# Patient Record
Sex: MALE | Race: WHITE | NOT HISPANIC OR LATINO | Employment: OTHER | URBAN - METROPOLITAN AREA
[De-identification: names, ages, dates, MRNs, and addresses within clinical notes are randomized per-mention and may not be internally consistent; named-entity substitution may affect disease eponyms.]

---

## 2021-01-14 DIAGNOSIS — Z23 NEED FOR VACCINATION: ICD-10-CM

## 2021-01-17 ENCOUNTER — HOSPITAL ENCOUNTER (OUTPATIENT)
Facility: MEDICAL CENTER | Age: 76
End: 2021-01-20
Attending: EMERGENCY MEDICINE | Admitting: STUDENT IN AN ORGANIZED HEALTH CARE EDUCATION/TRAINING PROGRAM
Payer: COMMERCIAL

## 2021-01-17 ENCOUNTER — APPOINTMENT (OUTPATIENT)
Dept: RADIOLOGY | Facility: MEDICAL CENTER | Age: 76
End: 2021-01-17
Attending: EMERGENCY MEDICINE
Payer: COMMERCIAL

## 2021-01-17 DIAGNOSIS — S01.311A LACERATION OF RIGHT EAR LOBE, INITIAL ENCOUNTER: ICD-10-CM

## 2021-01-17 DIAGNOSIS — R40.4 ALTERED LEVEL OF CONSCIOUSNESS: ICD-10-CM
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
## 2021-01-17 DIAGNOSIS — S09.90XA CLOSED HEAD INJURY, INITIAL ENCOUNTER: ICD-10-CM

## 2021-01-17 LAB
BASOPHILS # BLD AUTO: 0.7 % (ref 0–1.8)
BASOPHILS # BLD: 0.08 K/UL (ref 0–0.12)
EOSINOPHIL # BLD AUTO: 0.23 K/UL (ref 0–0.51)
EOSINOPHIL NFR BLD: 1.9 % (ref 0–6.9)
ERYTHROCYTE [DISTWIDTH] IN BLOOD BY AUTOMATED COUNT: 44.7 FL (ref 35.9–50)
HCT VFR BLD AUTO: 45.1 % (ref 42–52)
HGB BLD-MCNC: 14.8 G/DL (ref 14–18)
IMM GRANULOCYTES # BLD AUTO: 0.1 K/UL (ref 0–0.11)
IMM GRANULOCYTES NFR BLD AUTO: 0.8 % (ref 0–0.9)
LYMPHOCYTES # BLD AUTO: 4.33 K/UL (ref 1–4.8)
LYMPHOCYTES NFR BLD: 35.3 % (ref 22–41)
MCH RBC QN AUTO: 30.2 PG (ref 27–33)
MCHC RBC AUTO-ENTMCNC: 32.8 G/DL (ref 33.7–35.3)
MCV RBC AUTO: 92 FL (ref 81.4–97.8)
MONOCYTES # BLD AUTO: 0.91 K/UL (ref 0–0.85)
MONOCYTES NFR BLD AUTO: 7.4 % (ref 0–13.4)
NEUTROPHILS # BLD AUTO: 6.63 K/UL (ref 1.82–7.42)
NEUTROPHILS NFR BLD: 53.9 % (ref 44–72)
NRBC # BLD AUTO: 0 K/UL
NRBC BLD-RTO: 0 /100 WBC
PLATELET # BLD AUTO: 219 K/UL (ref 164–446)
PMV BLD AUTO: 9.3 FL (ref 9–12.9)
RBC # BLD AUTO: 4.9 M/UL (ref 4.7–6.1)
WBC # BLD AUTO: 12.3 K/UL (ref 4.8–10.8)

## 2021-01-17 PROCEDURE — 304217 HCHG IRRIGATION SYSTEM

## 2021-01-17 PROCEDURE — 70450 CT HEAD/BRAIN W/O DYE: CPT

## 2021-01-17 PROCEDURE — 96372 THER/PROPH/DIAG INJ SC/IM: CPT

## 2021-01-17 PROCEDURE — 85025 COMPLETE CBC W/AUTO DIFF WBC: CPT

## 2021-01-17 PROCEDURE — 304999 HCHG REPAIR-SIMPLE/INTERMED LEVEL 1

## 2021-01-17 PROCEDURE — 36415 COLL VENOUS BLD VENIPUNCTURE: CPT

## 2021-01-17 PROCEDURE — 99285 EMERGENCY DEPT VISIT HI MDM: CPT

## 2021-01-17 PROCEDURE — 82077 ASSAY SPEC XCP UR&BREATH IA: CPT

## 2021-01-17 PROCEDURE — 93005 ELECTROCARDIOGRAM TRACING: CPT | Performed by: EMERGENCY MEDICINE

## 2021-01-17 PROCEDURE — 303747 HCHG EXTRA SUTURE

## 2021-01-17 PROCEDURE — 700111 HCHG RX REV CODE 636 W/ 250 OVERRIDE (IP)

## 2021-01-17 PROCEDURE — 83735 ASSAY OF MAGNESIUM: CPT

## 2021-01-17 PROCEDURE — 84146 ASSAY OF PROLACTIN: CPT

## 2021-01-17 PROCEDURE — 84484 ASSAY OF TROPONIN QUANT: CPT

## 2021-01-17 PROCEDURE — 72125 CT NECK SPINE W/O DYE: CPT

## 2021-01-17 PROCEDURE — 80053 COMPREHEN METABOLIC PANEL: CPT

## 2021-01-17 RX ORDER — HALOPERIDOL 5 MG/ML
INJECTION INTRAMUSCULAR
Status: COMPLETED
Start: 2021-01-17 | End: 2021-01-17

## 2021-01-17 RX ORDER — HALOPERIDOL 5 MG/ML
5 INJECTION INTRAMUSCULAR EVERY 4 HOURS PRN
Status: DISCONTINUED | OUTPATIENT
Start: 2021-01-17 | End: 2021-01-20 | Stop reason: HOSPADM

## 2021-01-17 RX ADMIN — HALOPERIDOL LACTATE 5 MG: 5 INJECTION, SOLUTION INTRAMUSCULAR at 23:09

## 2021-01-18 ENCOUNTER — APPOINTMENT (OUTPATIENT)
Dept: RADIOLOGY | Facility: MEDICAL CENTER | Age: 76
End: 2021-01-18
Attending: EMERGENCY MEDICINE
Payer: COMMERCIAL

## 2021-01-18 ENCOUNTER — APPOINTMENT (OUTPATIENT)
Dept: RADIOLOGY | Facility: MEDICAL CENTER | Age: 76
End: 2021-01-18
Attending: STUDENT IN AN ORGANIZED HEALTH CARE EDUCATION/TRAINING PROGRAM
Payer: COMMERCIAL

## 2021-01-18 DIAGNOSIS — R56.9 SEIZURE-LIKE ACTIVITY (HCC): Primary | ICD-10-CM

## 2021-01-18 PROBLEM — Z72.0 TOBACCO USE: Status: ACTIVE | Noted: 2021-01-18

## 2021-01-18 PROBLEM — E87.29 METABOLIC ACIDOSIS, INCREASED ANION GAP (IAG): Status: ACTIVE | Noted: 2021-01-18

## 2021-01-18 PROBLEM — D72.829 LEUKOCYTOSIS: Status: ACTIVE | Noted: 2021-01-18

## 2021-01-18 LAB
ALBUMIN SERPL BCP-MCNC: 4 G/DL (ref 3.2–4.9)
ALBUMIN/GLOB SERPL: 1.3 G/DL
ALP SERPL-CCNC: 79 U/L (ref 30–99)
ALT SERPL-CCNC: 8 U/L (ref 2–50)
AMMONIA PLAS-SCNC: 24 UMOL/L (ref 11–45)
ANION GAP SERPL CALC-SCNC: 18 MMOL/L (ref 7–16)
APPEARANCE UR: CLEAR
AST SERPL-CCNC: 13 U/L (ref 12–45)
BACTERIA #/AREA URNS HPF: ABNORMAL /HPF
BILIRUB SERPL-MCNC: 0.2 MG/DL (ref 0.1–1.5)
BILIRUB UR QL STRIP.AUTO: NEGATIVE
BUN SERPL-MCNC: 16 MG/DL (ref 8–22)
CALCIUM SERPL-MCNC: 9.2 MG/DL (ref 8.5–10.5)
CHLORIDE SERPL-SCNC: 101 MMOL/L (ref 96–112)
CK SERPL-CCNC: 290 U/L (ref 0–154)
CO2 SERPL-SCNC: 16 MMOL/L (ref 20–33)
COLOR UR: YELLOW
CREAT SERPL-MCNC: 1.12 MG/DL (ref 0.5–1.4)
EKG IMPRESSION: NORMAL
EPI CELLS #/AREA URNS HPF: NEGATIVE /HPF
ETHANOL BLD-MCNC: <10.1 MG/DL (ref 0–10)
GLOBULIN SER CALC-MCNC: 3.1 G/DL (ref 1.9–3.5)
GLUCOSE SERPL-MCNC: 165 MG/DL (ref 65–99)
GLUCOSE UR STRIP.AUTO-MCNC: NEGATIVE MG/DL
HYALINE CASTS #/AREA URNS LPF: ABNORMAL /LPF
KETONES UR STRIP.AUTO-MCNC: NEGATIVE MG/DL
LACTATE BLD-SCNC: 1.3 MMOL/L (ref 0.5–2)
LACTATE BLD-SCNC: 2.8 MMOL/L (ref 0.5–2)
LEUKOCYTE ESTERASE UR QL STRIP.AUTO: NEGATIVE
MAGNESIUM SERPL-MCNC: 2.1 MG/DL (ref 1.5–2.5)
MICRO URNS: ABNORMAL
NITRITE UR QL STRIP.AUTO: POSITIVE
PH UR STRIP.AUTO: 6.5 [PH] (ref 5–8)
POTASSIUM SERPL-SCNC: 3.5 MMOL/L (ref 3.6–5.5)
PROLACTIN SERPL-MCNC: 56.6 NG/ML (ref 2.1–17.7)
PROT SERPL-MCNC: 7.1 G/DL (ref 6–8.2)
PROT UR QL STRIP: NEGATIVE MG/DL
RBC # URNS HPF: ABNORMAL /HPF
RBC UR QL AUTO: NEGATIVE
SARS-COV+SARS-COV-2 AG RESP QL IA.RAPID: NOTDETECTED
SARS-COV-2 RNA RESP QL NAA+PROBE: NOTDETECTED
SODIUM SERPL-SCNC: 135 MMOL/L (ref 135–145)
SP GR UR STRIP.AUTO: 1.03
SPECIMEN SOURCE: NORMAL
SPECIMEN SOURCE: NORMAL
TROPONIN T SERPL-MCNC: 10 NG/L (ref 6–19)
UROBILINOGEN UR STRIP.AUTO-MCNC: 0.2 MG/DL
WBC #/AREA URNS HPF: ABNORMAL /HPF

## 2021-01-18 PROCEDURE — 303747 HCHG EXTRA SUTURE

## 2021-01-18 PROCEDURE — 304999 HCHG REPAIR-SIMPLE/INTERMED LEVEL 1

## 2021-01-18 PROCEDURE — 700117 HCHG RX CONTRAST REV CODE 255: Performed by: EMERGENCY MEDICINE

## 2021-01-18 PROCEDURE — 82140 ASSAY OF AMMONIA: CPT

## 2021-01-18 PROCEDURE — 700111 HCHG RX REV CODE 636 W/ 250 OVERRIDE (IP): Performed by: STUDENT IN AN ORGANIZED HEALTH CARE EDUCATION/TRAINING PROGRAM

## 2021-01-18 PROCEDURE — U0005 INFEC AGEN DETEC AMPLI PROBE: HCPCS

## 2021-01-18 PROCEDURE — G0378 HOSPITAL OBSERVATION PER HR: HCPCS

## 2021-01-18 PROCEDURE — 82550 ASSAY OF CK (CPK): CPT

## 2021-01-18 PROCEDURE — 74018 RADEX ABDOMEN 1 VIEW: CPT

## 2021-01-18 PROCEDURE — 70498 CT ANGIOGRAPHY NECK: CPT

## 2021-01-18 PROCEDURE — 95816 EEG AWAKE AND DROWSY: CPT | Performed by: STUDENT IN AN ORGANIZED HEALTH CARE EDUCATION/TRAINING PROGRAM

## 2021-01-18 PROCEDURE — 0042T CT-CEREBRAL PERFUSION ANALYSIS: CPT

## 2021-01-18 PROCEDURE — 304217 HCHG IRRIGATION SYSTEM

## 2021-01-18 PROCEDURE — 36415 COLL VENOUS BLD VENIPUNCTURE: CPT

## 2021-01-18 PROCEDURE — 71045 X-RAY EXAM CHEST 1 VIEW: CPT

## 2021-01-18 PROCEDURE — 700101 HCHG RX REV CODE 250: Performed by: EMERGENCY MEDICINE

## 2021-01-18 PROCEDURE — 87426 SARSCOV CORONAVIRUS AG IA: CPT

## 2021-01-18 PROCEDURE — 99406 BEHAV CHNG SMOKING 3-10 MIN: CPT | Performed by: STUDENT IN AN ORGANIZED HEALTH CARE EDUCATION/TRAINING PROGRAM

## 2021-01-18 PROCEDURE — A9270 NON-COVERED ITEM OR SERVICE: HCPCS | Performed by: STUDENT IN AN ORGANIZED HEALTH CARE EDUCATION/TRAINING PROGRAM

## 2021-01-18 PROCEDURE — 95816 EEG AWAKE AND DROWSY: CPT | Mod: 26 | Performed by: STUDENT IN AN ORGANIZED HEALTH CARE EDUCATION/TRAINING PROGRAM

## 2021-01-18 PROCEDURE — U0003 INFECTIOUS AGENT DETECTION BY NUCLEIC ACID (DNA OR RNA); SEVERE ACUTE RESPIRATORY SYNDROME CORONAVIRUS 2 (SARS-COV-2) (CORONAVIRUS DISEASE [COVID-19]), AMPLIFIED PROBE TECHNIQUE, MAKING USE OF HIGH THROUGHPUT TECHNOLOGIES AS DESCRIBED BY CMS-2020-01-R: HCPCS

## 2021-01-18 PROCEDURE — 83605 ASSAY OF LACTIC ACID: CPT

## 2021-01-18 PROCEDURE — 70496 CT ANGIOGRAPHY HEAD: CPT

## 2021-01-18 PROCEDURE — 81001 URINALYSIS AUTO W/SCOPE: CPT

## 2021-01-18 PROCEDURE — 700105 HCHG RX REV CODE 258: Performed by: STUDENT IN AN ORGANIZED HEALTH CARE EDUCATION/TRAINING PROGRAM

## 2021-01-18 PROCEDURE — 700102 HCHG RX REV CODE 250 W/ 637 OVERRIDE(OP): Performed by: STUDENT IN AN ORGANIZED HEALTH CARE EDUCATION/TRAINING PROGRAM

## 2021-01-18 PROCEDURE — 99220 PR INITIAL OBSERVATION CARE,LEVL III: CPT | Mod: 25 | Performed by: STUDENT IN AN ORGANIZED HEALTH CARE EDUCATION/TRAINING PROGRAM

## 2021-01-18 RX ORDER — BISACODYL 10 MG
10 SUPPOSITORY, RECTAL RECTAL
Status: DISCONTINUED | OUTPATIENT
Start: 2021-01-18 | End: 2021-01-20 | Stop reason: HOSPADM

## 2021-01-18 RX ORDER — AMOXICILLIN 250 MG
2 CAPSULE ORAL 2 TIMES DAILY
Status: DISCONTINUED | OUTPATIENT
Start: 2021-01-18 | End: 2021-01-20 | Stop reason: HOSPADM

## 2021-01-18 RX ORDER — RISPERIDONE 1 MG/1
1 TABLET ORAL 2 TIMES DAILY
Status: DISCONTINUED | OUTPATIENT
Start: 2021-01-18 | End: 2021-01-18

## 2021-01-18 RX ORDER — AMITRIPTYLINE HYDROCHLORIDE 10 MG/1
10 TABLET, FILM COATED ORAL NIGHTLY
Status: DISCONTINUED | OUTPATIENT
Start: 2021-01-18 | End: 2021-01-18

## 2021-01-18 RX ORDER — LIDOCAINE HYDROCHLORIDE 10 MG/ML
20 INJECTION, SOLUTION INFILTRATION; PERINEURAL ONCE
Status: COMPLETED | OUTPATIENT
Start: 2021-01-18 | End: 2021-01-18

## 2021-01-18 RX ORDER — PROPRANOLOL HYDROCHLORIDE 10 MG/1
10 TABLET ORAL 2 TIMES DAILY
Status: DISCONTINUED | OUTPATIENT
Start: 2021-01-18 | End: 2021-01-18

## 2021-01-18 RX ORDER — NICOTINE 21 MG/24HR
21 PATCH, TRANSDERMAL 24 HOURS TRANSDERMAL
Status: DISCONTINUED | OUTPATIENT
Start: 2021-01-18 | End: 2021-01-20 | Stop reason: HOSPADM

## 2021-01-18 RX ORDER — ACETAMINOPHEN 325 MG/1
650 TABLET ORAL EVERY 6 HOURS PRN
Status: DISCONTINUED | OUTPATIENT
Start: 2021-01-18 | End: 2021-01-20 | Stop reason: HOSPADM

## 2021-01-18 RX ORDER — LABETALOL HYDROCHLORIDE 5 MG/ML
10 INJECTION, SOLUTION INTRAVENOUS EVERY 4 HOURS PRN
Status: DISCONTINUED | OUTPATIENT
Start: 2021-01-18 | End: 2021-01-20 | Stop reason: HOSPADM

## 2021-01-18 RX ORDER — OMEPRAZOLE 20 MG/1
20 CAPSULE, DELAYED RELEASE ORAL DAILY
Status: DISCONTINUED | OUTPATIENT
Start: 2021-01-18 | End: 2021-01-20 | Stop reason: HOSPADM

## 2021-01-18 RX ORDER — POLYETHYLENE GLYCOL 3350 17 G/17G
1 POWDER, FOR SOLUTION ORAL
Status: DISCONTINUED | OUTPATIENT
Start: 2021-01-18 | End: 2021-01-20 | Stop reason: HOSPADM

## 2021-01-18 RX ORDER — LORAZEPAM 2 MG/ML
4 INJECTION INTRAMUSCULAR
Status: DISCONTINUED | OUTPATIENT
Start: 2021-01-18 | End: 2021-01-20 | Stop reason: HOSPADM

## 2021-01-18 RX ORDER — SODIUM CHLORIDE, SODIUM LACTATE, POTASSIUM CHLORIDE, CALCIUM CHLORIDE 600; 310; 30; 20 MG/100ML; MG/100ML; MG/100ML; MG/100ML
1000 INJECTION, SOLUTION INTRAVENOUS CONTINUOUS
Status: ACTIVE | OUTPATIENT
Start: 2021-01-18 | End: 2021-01-19

## 2021-01-18 RX ORDER — POTASSIUM CHLORIDE 20 MEQ/1
40 TABLET, EXTENDED RELEASE ORAL ONCE
Status: COMPLETED | OUTPATIENT
Start: 2021-01-18 | End: 2021-01-18

## 2021-01-18 RX ADMIN — IOHEXOL 80 ML: 350 INJECTION, SOLUTION INTRAVENOUS at 00:47

## 2021-01-18 RX ADMIN — OMEPRAZOLE 20 MG: 20 CAPSULE, DELAYED RELEASE ORAL at 06:27

## 2021-01-18 RX ADMIN — TETRACAINE HCL 3 ML: 10 INJECTION SUBARACHNOID at 00:46

## 2021-01-18 RX ADMIN — ENOXAPARIN SODIUM 40 MG: 40 INJECTION SUBCUTANEOUS at 06:27

## 2021-01-18 RX ADMIN — SODIUM CHLORIDE, POTASSIUM CHLORIDE, SODIUM LACTATE AND CALCIUM CHLORIDE 1000 ML: 600; 310; 30; 20 INJECTION, SOLUTION INTRAVENOUS at 14:55

## 2021-01-18 RX ADMIN — POTASSIUM CHLORIDE 40 MEQ: 1500 TABLET, EXTENDED RELEASE ORAL at 06:27

## 2021-01-18 RX ADMIN — LIDOCAINE HYDROCHLORIDE 20 ML: 10 INJECTION, SOLUTION INFILTRATION; PERINEURAL at 01:30

## 2021-01-18 RX ADMIN — NICOTINE TRANSDERMAL SYSTEM 21 MG: 21 PATCH, EXTENDED RELEASE TRANSDERMAL at 06:27

## 2021-01-18 RX ADMIN — SODIUM CHLORIDE, POTASSIUM CHLORIDE, SODIUM LACTATE AND CALCIUM CHLORIDE 1000 ML: 600; 310; 30; 20 INJECTION, SOLUTION INTRAVENOUS at 21:35

## 2021-01-18 ASSESSMENT — ENCOUNTER SYMPTOMS
DEPRESSION: 0
WEAKNESS: 0
BACK PAIN: 0
SHORTNESS OF BREATH: 0
ABDOMINAL PAIN: 0
SORE THROAT: 0
FOCAL WEAKNESS: 0
DOUBLE VISION: 0
BRUISES/BLEEDS EASILY: 0
DIARRHEA: 0
PALPITATIONS: 0
LOSS OF CONSCIOUSNESS: 1
NAUSEA: 0
HEARTBURN: 0
CHILLS: 0
VOMITING: 0
BLURRED VISION: 0
CONSTIPATION: 0
COUGH: 0
INSOMNIA: 0
NECK PAIN: 0
BLOOD IN STOOL: 0
WHEEZING: 0
HEADACHES: 1
FEVER: 0

## 2021-01-18 ASSESSMENT — PAIN DESCRIPTION - PAIN TYPE
TYPE: ACUTE PAIN

## 2021-01-18 ASSESSMENT — LIFESTYLE VARIABLES: REASON UNABLE TO ASSESS: AMS

## 2021-01-18 NOTE — PROGRESS NOTES
20# placed to Left Forearm.   Lactic Acid drawn and sent to lab.   Pt reseting comfortable, Call light within reach of pt.   Tele-monitoring, pulse ox in place.

## 2021-01-18 NOTE — PROGRESS NOTES
Pt unable to answer MRI screening questions appropriately. Per MD okay to get xray's done prior to MRI. Orders received. MRI technician notified.

## 2021-01-18 NOTE — DISCHARGE PLANNING
Medical Social Work    MSW responded to TBI for pt Sudeep Boyd  1945. Per [facility]Y pts daughter was on scene and should be coming to hospital.

## 2021-01-18 NOTE — DISCHARGE PLANNING
Bedside assessment completed with information obtained from pt's Daughter Niya. Niya states she recently moved to [place] to help father with his needs. They live together at [address] [place] [address]. Niya is unaware of PCP for her father. Niya states she is POA for healthcare and will bring in paperwork on returning to hospital in am. Niya states that pt was independent prior to this fall. [facility] comes weekly. Pt has a walker, shower chair and cane at home. Niya says they will use the [facility] for any RX's prescribed and be available upon pt's DC for transportation.     Care Transition Team Assessment    Information Source  Orientation : Unable to Assess  Information Given By: Relative  Informant's Name: Niya  Who is responsible for making decisions for patient? : POA  Name(s) of Primary Decision Maker: Niya    Readmission Evaluation  Is this a readmission?: No         Interdisciplinary Discharge Planning  Does Admitting Nurse Feel This Could be a Complex Discharge?: Yes  Primary Care Physician: (???)  Lives with - Patient's Self Care Capacity: Adult Children(Niya (daughter))  Support Systems: Friends / Neighbors(Daughter (Niya))  Housing / Facility: [address] [place] [address]  Do You Take your Prescribed Medications Regularly: ([facility])  Able to Return to Previous ADL's: (unknown)  Mobility Issues: (prior to admit no issues)  Prior Services: Home With Outpatient Therapy  Patient Prefers to be Discharged to:: unknown  Assistance Needed: Unknown at this Time  Durable Medical Equipment: Walker(shower chair, cane)  DME Provider / Phone: [facility]([phone])    Discharge Preparedness  What are your discharge supports?: (Daughter Niya)  Prior Functional Level: Ambulatory, Independent with Activities of Daily Living, Uses Cane, Uses Walker  Difficulity with ADLs: Walking  Difficulty with ADLs Comment: cane or walker  Difficulity with IADLs: Driving, Cooking, Laundry, Shopping  Difficulity  "with IADL Comments: (Niya (daughter) assists)    Functional Assesment  Prior Functional Level: Ambulatory, Independent with Activities of Daily Living, Uses Cane, Uses Walker    Finances  Financial Barriers to Discharge: (unknown)  Prescription Coverage: (unknown)    Vision / Hearing Impairment  Vision Impairment : No  Hearing Impairment : No    Values / Beliefs / Concerns  Values / Beliefs Concerns : No    Advance Directive  Advance Directive?: Living Will         Psychological Assessment  History of Substance Abuse: Alcohol  Date Last Used - Alcohol: \"in the past\"   History of Psychiatric Problems: No    Discharge Risks or Barriers  Discharge risks or barriers?: Uninsured / underinsured, Complex medical needs  Patient risk factors: Cognitive / sensory / physical deficit, Lack of outside supports, Substance abuse    Anticipated Discharge Information  Discharge Disposition: Still a Patient (30)  Discharge Address: [address] [place] [address]  Discharge Contact Phone Number: Niya [phone]        "

## 2021-01-18 NOTE — H&P
Hospital Medicine History & Physical Note    Date of Service  1/18/2021    Primary Care Physician  Pcp Pt States None    Consultants  Neurology    Code Status  Full Code    Chief Complaint  Chief Complaint   Patient presents with   • T-5000 FALL   • ALOC       History of Presenting Illness  75 y.o. male who presented 1/17/2021 with daughter at bedside for ground-level fall presumed to be CVA versus seizure.  Daughter states that patient's last known normal time was approximately 1800 last night.  She states that prior to that moment she was downstairs in the basement and heard a thud and rushed upstairs.  She found her father on the floor with blood covering his face and tremor/seizure-like activity.  She denies that he had any tongue biting, fecal or urinary incontinence however did woke up confused and likely in a post ictal state.  Patient was noted to be confused upon evaluation per ERP.  ERP states no other focal neurologic deficits appreciated per their evaluation.    At time of evaluation, patient states that he has some fatigue and memory loss from the above event.  He also complains of right ear pain which had been sutured prior to my evaluation.  Patient is alert and oriented x3 on my evaluation without any focal neurologic deficits.  He does admit to smoking 2 packs/day and in agreement for nicotine patch for his admission.    Vitals on admission were as follows: 97.6, 96, 15, 147/57, 91% on room air.    Labs were performed and CBC reveals leukocytosis of 12.3 without immature granulocytosis or neutrophilia and otherwise unremarkable.  CMP was performed which revealed mild hypokalemia at 3.5, anion gap metabolic acidosis with anion gap of 18 and CO2 of 16 as well as mild hyperglycemia at 165.  Diagnostic alcohol level less than 10.1, troponin T of 10.  Magnesium level was ordered and resulted at 2.1 and prolactin elevated at 56.60.  Covid test was negative.    CT head without contrast revealed mild  cerebral atrophy without any intracranial acute findings.  CT spine without contrast was ordered and did not reveal any bony injury to cervical spine.  CT cerebral perfusion analysis within normal limits.  CTA head with and without did not reveal any large vessel occlusion or aneurysms.  CT angio neck with and without reveals 50% stenosis of right internal carotid artery at the bifurcation, less than 50% of left carotid bifurcation and 75% stenosis of the origin of the right vertebral artery.    Neurology was consulted and recommended EEG and MRI.    Patient and daughter in agreement with admission to hospital for seizure-like activity versus CVA.  He agrees to full code at time of evaluation with daughter present.  He will be optimized for medical management prior to transfer to walsh floor.      Review of Systems  Review of Systems   Constitutional: Positive for malaise/fatigue. Negative for chills and fever.   HENT: Positive for ear pain (right). Negative for congestion and sore throat.         Xerostomia   Eyes: Negative for blurred vision and double vision.   Respiratory: Negative for cough, shortness of breath and wheezing.    Cardiovascular: Negative for chest pain and palpitations.   Gastrointestinal: Negative for abdominal pain, blood in stool, constipation, diarrhea, heartburn, melena, nausea and vomiting.   Genitourinary: Negative for dysuria and frequency.   Musculoskeletal: Negative for back pain and neck pain.   Skin: Negative for itching and rash.   Neurological: Positive for loss of consciousness and headaches. Negative for focal weakness and weakness.   Endo/Heme/Allergies: Negative for environmental allergies. Does not bruise/bleed easily.   Psychiatric/Behavioral: Negative for depression. The patient does not have insomnia.        Past Medical History   has a past medical history of Alzheimer's dementia (HCC), Anxiety, Neuropathy, Psychiatric illness, and Tarsal tunnel syndrome.    Surgical  History   has no past surgical history on file.     Family History  family history includes Heart Disease in his father; Lung Disease in his mother.     Social History   reports that he has been smoking cigarettes. He has a 104.00 pack-year smoking history. He has never used smokeless tobacco. He reports current alcohol use. He reports current drug use.    Allergies  Allergies   Allergen Reactions   • Codeine        Medications  Prior to Admission Medications   Prescriptions Last Dose Informant Patient Reported? Taking?   amitriptyline (ELAVIL) 10 MG TABS  Patient's Home Pharmacy Yes No   Sig: Take 10 mg by mouth every evening.     nitrofurantoin monohydr macro (MACROBID) 100 MG CAPS   No No   Sig: Take 1 Cap by mouth 2 times a day.   omeprazole (PRILOSEC) 20 MG CPDR  Patient's Home Pharmacy Yes No   Sig: Take 20 mg by mouth every day.     propranolol (INDERAL) 10 MG TABS   Yes No   Sig: Take 10 mg by mouth 2 times a day.     risperidone (RISPERDAL) 1 MG TABS   No No   Sig: Take 1 Tab by mouth 2 times a day.      Facility-Administered Medications: None       Physical Exam  Temp:  [36.4 °C (97.6 °F)] 36.4 °C (97.6 °F)  Pulse:  [75-98] 75  Resp:  [14-23] 14  BP: (108-173)/(53-77) 108/53  SpO2:  [89 %-95 %] 95 %    Physical Exam  Vitals signs reviewed.   Constitutional:       Appearance: He is normal weight. He is not toxic-appearing or diaphoretic.   HENT:      Head: Normocephalic.      Comments: Right Ear lac & repaired     Mouth/Throat:      Mouth: Mucous membranes are moist.      Pharynx: Oropharynx is clear. No oropharyngeal exudate or posterior oropharyngeal erythema.   Eyes:      General: No scleral icterus.     Extraocular Movements: Extraocular movements intact.      Conjunctiva/sclera: Conjunctivae normal.      Pupils: Pupils are equal, round, and reactive to light.   Neck:      Musculoskeletal: Normal range of motion and neck supple. No muscular tenderness.      Vascular: No carotid bruit.   Cardiovascular:       Rate and Rhythm: Normal rate and regular rhythm.      Pulses: Normal pulses.      Heart sounds: Normal heart sounds. No murmur. No friction rub. No gallop.    Pulmonary:      Effort: Pulmonary effort is normal.      Breath sounds: Normal breath sounds. No wheezing, rhonchi or rales.   Abdominal:      General: Bowel sounds are normal. There is no distension.      Palpations: Abdomen is soft. There is no mass.      Tenderness: There is no abdominal tenderness. There is no guarding or rebound.      Hernia: No hernia is present.   Musculoskeletal: Normal range of motion.      Right lower leg: No edema.      Left lower leg: No edema.   Lymphadenopathy:      Cervical: No cervical adenopathy.   Skin:     General: Skin is warm and dry.      Capillary Refill: Capillary refill takes less than 2 seconds.      Coloration: Skin is not pale.      Findings: No erythema or rash.   Neurological:      General: No focal deficit present.      Mental Status: He is alert and oriented to person, place, and time. Mental status is at baseline.      Cranial Nerves: No cranial nerve deficit.      Sensory: No sensory deficit.      Motor: No weakness.      Coordination: Coordination normal.      Deep Tendon Reflexes: Reflexes normal.      Comments: Alert and oriented x3, moves all 4 extremities, no tremor like activity, muscle strength 5-5 upper and lower extremities bilaterally, deep tendon reflexes within normal limits upper and lower extremities bilaterally, cranial nerves II through XII intact.  Negative rapid alternating motion, negative finger-to-nose, negative for dysmetria, negative for loss of sensation upper and lower extremity bilaterally.  No focal deficits appreciated on physical examination.   Psychiatric:         Mood and Affect: Mood normal.         Behavior: Behavior normal.         Thought Content: Thought content normal.         Judgment: Judgment normal.         Laboratory:  Recent Labs     01/17/21  2319   WBC 12.3*    RBC 4.90   HEMOGLOBIN 14.8   HEMATOCRIT 45.1   MCV 92.0   MCH 30.2   MCHC 32.8*   RDW 44.7   PLATELETCT 219   MPV 9.3     Recent Labs     01/17/21  2319   SODIUM 135   POTASSIUM 3.5*   CHLORIDE 101   CO2 16*   GLUCOSE 165*   BUN 16   CREATININE 1.12   CALCIUM 9.2     Recent Labs     01/17/21  2319   ALTSGPT 8   ASTSGOT 13   ALKPHOSPHAT 79   TBILIRUBIN 0.2   GLUCOSE 165*         No results for input(s): NTPROBNP in the last 72 hours.      Recent Labs     01/17/21  2319   TROPONINT 10       Imaging:  CT-CTA HEAD WITH & W/O-POST PROCESS   Final Result         1.  No large vessel occlusion or aneurysm appreciated.      CT-CTA NECK WITH & W/O-POST PROCESSING   Final Result         1.  50% stenosis of the proximal right internal carotid artery at the bifurcation.   2.  Less than 50% stenosis of the left carotid bifurcation.   3.  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  Atherosclerosis of the origin of the left vertebral artery, appears resulting in component of stenosis however transverse orientation of the left vertebral artery at its origin limits characterization.         CT-CEREBRAL PERFUSION ANALYSIS   Final Result         1.  Cerebral blood flow less than 30% likely representing completed infarct = 0 mL.      2.  T Max more than 6 seconds likely representing combination of completed infarct and ischemia = 0 mL.      3.  Mismatched volume likely representing ischemic brain/penumbra = None      4.  Please note that the cerebral perfusion was performed on the limited brain tissue around the basal ganglia region. Infarct/ischemia outside the CT perfusion sections can be missed in this study.      CT-HEAD W/O   Final Result         1.  No acute intracranial abnormality is identified, there are nonspecific white matter changes, commonly associated with small vessel ischemic disease.  Associated mild cerebral atrophy is noted.   2.  Atherosclerosis.      CT-CSPINE WITHOUT PLUS RECONS   Final Result          1.  No acute traumatic bony injury of the cervical spine is apparent.   2.  Atherosclerosis      MR-BRAIN-W/O    (Results Pending)   I reviewed the above radiographic imaging and do not appreciate any acute intracranial abnormalities.      Assessment/Plan:  I anticipate this patient is appropriate for observation status at this time.    * Seizure-like activity (HCC)- (present on admission)  Assessment & Plan  Lorazepam 4 mg IV every 10 minutes as needed seizure-like activity  All radiographic imaging regarding head negative at this point   Neurology consulted from ED and recommends EEG and MRI both ordered and pending  I had ordered prolactin which has been elevated on this admission likely indicating seizure-like activity  No focal neurologic deficits appreciated per my evaluation    Hypokalemia- (present on admission)  Assessment & Plan  Potassium level on admission 3.5  We will administer 40 mEq of K-Dur x1  BMP in a.m.  Magnesium within normal limits    Leukocytosis- (present on admission)  Assessment & Plan  Likely reactive as there is no neutrophilia or immature granulocytosis indicating infectious etiology.  CBC in a.m.    Tobacco use- (present on admission)  Assessment & Plan  I have counseled the patient on smoking cessation for 5 minutes and he is in agreement with nicotine patch.  He also is in agreement with outpatient smoking cessation counseling as he would like to quit smoking.    Metabolic acidosis, increased anion gap (IAG)- (present on admission)  Assessment & Plan  Likely related to lactic acidosis from seizure-like activity  We will check lactic acid levels  Alcohol level less than 10  BMP in a.m.

## 2021-01-18 NOTE — PROCEDURES
VIDEO ELECTROENCEPHALOGRAM REPORT      Referring provider: Dr. Mason    DOS: 01/18/21 (0 hours and 26 minutes of total recording time).     INDICATION:  Sudeep Boyd 75 y.o. male presenting with altered mental status and seizure-like activity    CURRENT ANTIEPILEPTIC AND/OR SEDATING REGIMEN: No AEDs    TECHNIQUE: 30 channel video electroencephalogram (EEG) was performed in accordance with the international 10-20 system. The study was reviewed in bipolar and referential montages. The recording examined the patient in the awake and drowsy state(s).     DESCRIPTION OF THE RECORD:  During wakefulness, the background was continuous and showed a 8.5-9 Hz posterior dominant rhythm.  There was reactivity to eye closure/opening.  A normal anterior-posterior gradient was noted with faster beta frequencies seen anteriorly.  During drowsiness, theta/delta frequencies were seen.    Sleep was captured and was characterized by diffuse background delta/theta activity with a loss of myogenic artifact.  N2 sleep transients in the form of sleep spindles and vertex waves were seen in the leads over the central regions.     ACTIVATION PROCEDURES:   Intermittent Photic stimulation was performed in a stepwise fashion from 1 to 30 Hz, and did not elicit any abnormal responses.      ICTAL AND INTERICTAL FINDINGS:   No focal or generalized epileptiform activity noted.     No regional slowing was seen during this routine study.      No clinical events or seizures were reported or recorded during the study.     EKG: sampling of the EKG recording did not demonstrate any abnormalities    EVENTS:  None    INTERPRETATION:  normal video EEG recording in the awake and drowsy state(s):  - No persistent focal asymmetries seen.  - No definitive epileptiform discharges seen   - No definitive seizures. Clinical correlation is recommended.      Note: A normal EEG does not rule out epilepsy.  If the clinical suspicion remains high for seizures, a  prolonged recording to capture clinical or subclinical events may be helpful.        Travis Mir MD  Epilepsy and General Neurology  Department of Neurology  Instructor of Clinical Neurology [facility].   Office: [phone]  Fax: [phone]

## 2021-01-18 NOTE — ED PROVIDER NOTES
ED Provider Note    Scribed for Tony Arevalo M.D. by Kaden Hernandez. 1/17/2021  11:06 PM    Primary care provider: Pcp Pt States None  Means of arrival: EMS  History obtained from: EMS and patient's daughter  History limited by: Altered level of consciousness    CHIEF COMPLAINT  Chief Complaint   Patient presents with   • T-5000 FALL   • ALOC     HPI  Sudeep Boyd is a 75 y.o. male who presents to the Emergency Department for a possible TBI. Per the patient's daughter she was downstairs in the basement of the home they share when she heard a thud upstairs; upon going upstairs, she found him on the floor bleeding from his right ear and drooling. The last time she saw him at baseline was at 6:30 PM; at that time he was talking normally to her. She denies cough, fever, diarrhea, drug use, history of seizures, change in medication, and adds that he last drank alcohol 10 years ago. EMS states that when they arrived on scene, the patient was altered and could not answer their questions, such as what his name is; patient's daughter told EMS that he is normally alert and oriented. On route to the ED, his blood pressure was 190/90, and he reportedly pulled away from painful stimuli.       HPI limited by: Altered level of consciousness    REVIEW OF SYSTEMS  Pertinent positives include: Ground level fall, ALOC, laceration to the right ear. Pertinent negatives include: Cough, fever, or diarrhea. See history of present illness.     ROS limited by: Altered level of consciousness      PAST MEDICAL HISTORY   has a past medical history of Alzheimer's dementia, Anxiety, Neuropathy, Psychiatric illness, and Tarsal tunnel syndrome.    SURGICAL HISTORY  patient denies any surgical history    SOCIAL HISTORY  Social History     Tobacco Use   • Smoking status: Current Every Day Smoker     Packs/day: 2.00     Years: 52.00     Pack years: 104.00     Types: Cigarettes   • Smokeless tobacco: Never Used   Substance Use Topics   •  "Alcohol use: Yes     Comment: 5 beers a day   • Drug use: Yes     Comment: history of Marajuana use      Social History     Substance and Sexual Activity   Drug Use Yes    Comment: history of Marajuana use       FAMILY HISTORY  None pertinent     CURRENT MEDICATIONS  Current Outpatient Medications   Medication Instructions   • amitriptyline (ELAVIL) 10 mg, NIGHTLY   • nitrofurantoin (MACROBID) 100 mg, Oral, 2 TIMES DAILY   • omeprazole (PRILOSEC) 20 mg, DAILY   • propranolol (INDERAL) 10 mg, 2 TIMES DAILY   • risperiDONE (RISPERDAL) 1 mg, Oral, 2 TIMES DAILY     ALLERGIES  Allergies   Allergen Reactions   • Codeine        PHYSICAL EXAM  VITAL SIGNS: /58   Pulse 88   Resp (!) 21   Ht 1.803 m (5' 11\")   Wt 72.1 kg (159 lb)   SpO2 92%   BMI 22.18 kg/m²     Constitutional: Alert in no apparent distress.  HENT:  Bilateral external ears normal, Nose normal. Uvula midline. Right ear laceration with no obvious hematoma. 3cm laceration.   Eyes: Pupils are equal and reactive, Conjunctiva normal, Non-icteric.   Neck: Normal range of motion, No tenderness, Supple, No stridor.   Lymphatic: No lymphadenopathy noted.   Cardiovascular: Regular rate and rhythm, no murmurs.   Thorax & Lungs: Normal breath sounds, No respiratory distress, No wheezing, No chest tenderness.   Abdomen:  Soft, No tenderness, No peritoneal signs, No masses, No pulsatile masses.   Skin: Warm, Dry, No erythema, No rash.   Back: No bony tenderness, No CVA tenderness.   Extremities: Intact distal pulses, No edema, No tenderness, No cyanosis.  Musculoskeletal: Good range of motion in all major joints. No tenderness to palpation or major deformities noted.   Neurologic: Alert , Normal motor function, Normal sensory function, No focal deficits noted. Moves all 4 extremities. Cranial nerves II through XII intact.  5 out of 5 strength x4.  Sensation intact light touch.  Normal finger-nose-finger.  Normal reflexes bilaterally.  No clonus. EOMI. PERRL. " Mentation is improving. Follows commands.   Psychiatric: Affect normal, Judgment normal, Mood normal.     DIAGNOSTIC STUDIES / PROCEDURES    LABS  Labs Reviewed   CBC WITH DIFFERENTIAL - Abnormal; Notable for the following components:       Result Value    WBC 12.3 (*)     MCHC 32.8 (*)     Monos (Absolute) 0.91 (*)     All other components within normal limits   COMP METABOLIC PANEL - Abnormal; Notable for the following components:    Potassium 3.5 (*)     Co2 16 (*)     Anion Gap 18.0 (*)     Glucose 165 (*)     All other components within normal limits   TROPONIN   DIAGNOSTIC ALCOHOL   ESTIMATED GFR   SARS-COV ANTIGEN JACKELYN    Narrative:     Have you been in close contact with a person who is suspected  or known to be positive for COVID-19 within the last 30 days  (e.g. last seen that person < 30 days ago)->No   SARS-COV-2, PCR (IN-HOUSE)    Narrative:     Have you been in close contact with a person who is suspected  or known to be positive for COVID-19 within the last 30 days  (e.g. last seen that person < 30 days ago)->No   AMMONIA      All labs reviewed by me.    EKG     Report   Date Value Ref Range Status   2021       [facility] Emergency Dept.    Test Date:  2021  Pt Name:    LENKA IZAGUIRRE               Department: ER  MRN:        [id number]                      Room:        20  Gender:     Male                         Technician: [id number]  :        [id number]                   Requested By:MELLISSA AREVALO  Order #:    [id number]                    Reading MD: Mellissa Arevalo MD    Measurements  Intervals                                Axis  Rate:       87                           P:          40  AL:         240                          QRS:        7  QRSD:       90                           T:          28  QT:         388  QTc:        467    Interpretive Statements  SINUS RHYTHM  FIRST DEGREE AV BLOCK  BORDERLINE LOW VOLTAGE IN FRONTAL LEADS  Compared to ECG 10/11/2013  02:06:57  No significant changes  Electronically Signed On 1- 2:25:13 PST by Tony Arevalo MD                  RADIOLOGY  CT-CTA HEAD WITH & W/O-POST PROCESS   Final Result         1.  No large vessel occlusion or aneurysm appreciated.      CT-CTA NECK WITH & W/O-POST PROCESSING   Final Result         1.  50% stenosis of the proximal right internal carotid artery at the bifurcation.   2.  Less than 50% stenosis of the left carotid bifurcation.   3.  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  Atherosclerosis of the origin of the left vertebral artery, appears resulting in component of stenosis however transverse orientation of the left vertebral artery at its origin limits characterization.         CT-CEREBRAL PERFUSION ANALYSIS   Final Result         1.  Cerebral blood flow less than 30% likely representing completed infarct = 0 mL.      2.  T Max more than 6 seconds likely representing combination of completed infarct and ischemia = 0 mL.      3.  Mismatched volume likely representing ischemic brain/penumbra = None      4.  Please note that the cerebral perfusion was performed on the limited brain tissue around the basal ganglia region. Infarct/ischemia outside the CT perfusion sections can be missed in this study.      CT-HEAD W/O   Final Result         1.  No acute intracranial abnormality is identified, there are nonspecific white matter changes, commonly associated with small vessel ischemic disease.  Associated mild cerebral atrophy is noted.   2.  Atherosclerosis.      CT-CSPINE WITHOUT PLUS RECONS   Final Result         1.  No acute traumatic bony injury of the cervical spine is apparent.   2.  Atherosclerosis        The radiologist's interpretation of all radiological studies have been reviewed by me.    Laceration Repair Procedure Note    Indication: Laceration    Procedure: The patient was placed in the appropriate position and anesthesia around the laceration was obtained by  infiltration using LET gel. The area was then irrigated with normal saline. The laceration was closed with 5.0 chromic gut x 4, simple interrupted. There were no additional lacerations requiring repair. The wound area was then dressed with bacitracin.      Total repaired wound length: 3 cm.     Other Items: None    The patient tolerated the procedure well.    Complications: None        COURSE & MEDICAL DECISION MAKING  Nursing notes, VS, PMSFHx reviewed in chart.    75 y.o. male p/w chief complaint of ALOC, GLF.    11:06 PM Patient seen and examined at the charge desk. Patient was treated with Haldol 5 mg. Ordered CT-C-spine w/o, CT-head w/o, Diagnostic alcohol, Troponin, CBC w/diff, CMP, and an EKG to evaluate.     I verified that the patient was wearing a mask and I was wearing appropriate PPE every time I entered the room. The patient's mask was on the patient at all times during my encounter except for a brief view of the oropharynx.     The differential diagnoses include but are not limited to:    Pt hx and PE concerning for CVA vs sz  CT w/ no evidence of ICH or large occlusive thrombus.  Plan for medicine admission for further monitoring and medication management  No e/o hemorrhagic conversion at this time      Daughter present who states that patient was confused and obtunded initially after she heard a loud crash on the floor above her.  Daughter states patient was last seen normal 2 hours prior to hearing loud noise.  Daughter denies any recent alcohol or drug use.  No over-the-counter medications.    Daughter states that she does feel like he was having shaking-like motion that was present     11:21 PM Reviewed the patient's CT-head w/o, which reveals no ICH    11:26 PM Review of patient's records reveal that he has a history of alcohol withdrawal.     11:27 PM Recheck: Patient re-evaluated at [facility]. Performed a more comprehensive physical exam. At this time the patient's mentation is improving and he  follows commands.     12:03 AM Recheck: Patient re-evaluated at beside. Patient's daughter is at bedside. Performed second neuro exam at this time. Discussed patient's condition and treatment plan with the patient's daughter and the patient, including a second CT scan. Patient's lab and radiology results discussed. The patient and patient's daughter understood and is in agreement. Initiated Stroke IR at this time.    12:18 Patient will be treated with LET 3 mL. Ordered CT-Cerebral perfusion analysis to evaluate.     1:12 AM Patient will be treated with lidocaine 1%  2:32 AM I discussed this case with Dr. Hugo with neurology who agrees with plan for MRI and potential EEG as inpatient given concern for seizure.    Patient's mentation continued to improve while in the emergency department.  Initially presented with slurred speech and unable to follow commands.  No focal neuro deficit or weakness.  Daughter reports that his slurred speech was present for approximately 1 hour.  Patient states back to baseline per daughter.  Patient acidotic at this time which would correspond with seizure.  Would consider repeating BMP in a.m.    Patient be admitted to Dr. Mason in guarded condition      FINAL IMPRESSION  1. Fall, initial encounter    2. Laceration of right ear lobe, initial encounter    3. Altered level of consciousness    4. Closed head injury, initial encounter          Kaden BEAULIEU (Scribe), am scribing for, and in the presence of, Tony Arevalo M.D..    Electronically signed by: Kaden Hernandez (Scribe), 1/17/2021    ITony M.D. personally performed the services described in this documentation, as scribed by Kaden Hernandez in my presence, and it is both accurate and complete.    C    The note accurately reflects work and decisions made by me.  Tony Arevalo M.D.  1/18/2021  2:31 AM

## 2021-01-18 NOTE — ED NOTES
Attending Hospitalist is Dr Garcia starting at 0700. Please contact this physician for orders, updates or questions today.

## 2021-01-18 NOTE — ED NOTES
Pt to CT with this RN on cardiac monitor. Pt agitated, unable to stay still in scan. Remains ALOC. Given 5mg IV haldol per MAR

## 2021-01-18 NOTE — ED NOTES
Pt toileted at bedside with urinal, reconnected to monitors, medicated. VSS no apparent distress. Denies any complaints

## 2021-01-18 NOTE — CONSULTS
Called by Dr. Arevalo    74 y/o male w/ hx of dementia, anxiety, hx of previous alcohol abuse w/ hx of alcohol withdrawal (unknown if resulted in seizures) presenting to the hospital for ground level fall and consulted for possibility of seizure. Patient's daughter heard the patient fall. When she found the patient she saw him bleeding from the ear and drooling. He had some difficulty talking which persisted while he is in the E.R. BP originally was high. At this point it's unclear if this constituted a seizure or not. While he has mild acidosis on arrival with a mild elevation in WBC this could be 2/2 the fall rather than true seizure. Alcohol level was negative here. CTA Head and Neck and CTP were performed which were unremarkable for acute ischemia.     Regarding workup, an MRI and EEG would be recommended. If these tests are both benign then no anti-seizure medication would be indicated, and outpatient followup would be appropriate. I will place referral for the clinic.     Neurology to follow with a formal consult if above testing is positive. If not, then will be followed outpatient.

## 2021-01-18 NOTE — ED NOTES
Med rec updated and complete  Allergies reviewed  Pt reports that he has not taken any medications for over 2-3 months  Called pts daughter (Niya) @ [phone], wrong number  Called sister (Rama) @ [phone], left message  Called other (Ronel) @ [phone], call keeps failed.    Called pts home home phone @ [phone], was pts voice mail    Called [facility] @ [phone] pt has a profile but has nothing on file  Pt reports no prescription medications, OTC's, or vitamins   Pt reports no antibiotics in the last 2 weeks

## 2021-01-18 NOTE — ED TRIAGE NOTES
Chief Complaint   Patient presents with   • T-5000 FALL   • ALOC     BIB [facility], pt ground level fall, unwitnessed, R ear has bloody drainage, GCS 13, c-collar in place.  MD at bedside on arrival

## 2021-01-18 NOTE — ASSESSMENT & PLAN NOTE
I have counseled the patient on smoking cessation for 5 minutes and he is in agreement with nicotine patch.  He also is in agreement with outpatient smoking cessation counseling as he would like to quit smoking.

## 2021-01-18 NOTE — ASSESSMENT & PLAN NOTE
Possible seizure/stroke  CTA head and neck no significant stenosis  Neurology consulted from ED and recommends EEG and MRI both ordered and pending  EEG did not show any seizure activities  MRI is pending  PT and OT; no need  No need for medication at this time

## 2021-01-18 NOTE — PROGRESS NOTES
Patient was admitted early this morning of 1/18/2021    75 y.o. male who presented 1/17/2021 for ground-level fall presumed to be CVA versus seizure. Patient was very somnolent the time when I evaluated him, but he is AOx2 (self, place) and is able to follow commands.     Per chart review, patient's daughter found her father on the floor with blood covering his face and tremor/seizure-like activity. No tongue biting, fecal or urinary incontinence however did wake up confused and likely in a post ictal state.     Lab remarkable for wbc 12.3, K 3.5, Anion gap 18, , prolactin 56.6. Normal ammonia and LA. Trop neg. Diagnostic alcohol neg    CT head, CT perfusion, CT-Cspine negative.   CTA neck showed 1. 50% stenosis of the proximal right internal carotid artery at the bifurcation. 2.  Less than 50% stenosis of the left carotid bifurcation.  3.  75% stenosis of the origin of the right vertebral artery  4.  Atherosclerosis of the origin of the left vertebral artery, appears resulting in component of stenosis however transverse orientation of the left vertebral artery at its origin limits characterization.    Evaluated by neurology and rec EEG and MRI.    Plans  1. EEG, MRI ordered, pending  2. Gentle IVF hydration  3. Follow neuro rec  4. Patient's home meds including amitriptyline 10mg nightly, propranolol 10mg bid and risperidone 1mg bid. He is currently very somnolent and will hold these 3 meds at this point.

## 2021-01-19 ENCOUNTER — APPOINTMENT (OUTPATIENT)
Dept: RADIOLOGY | Facility: MEDICAL CENTER | Age: 76
End: 2021-01-19
Attending: STUDENT IN AN ORGANIZED HEALTH CARE EDUCATION/TRAINING PROGRAM
Payer: COMMERCIAL

## 2021-01-19 LAB
ANION GAP SERPL CALC-SCNC: 12 MMOL/L (ref 7–16)
BUN SERPL-MCNC: 14 MG/DL (ref 8–22)
CALCIUM SERPL-MCNC: 9.1 MG/DL (ref 8.5–10.5)
CHLORIDE SERPL-SCNC: 101 MMOL/L (ref 96–112)
CO2 SERPL-SCNC: 21 MMOL/L (ref 20–33)
CREAT SERPL-MCNC: 1.04 MG/DL (ref 0.5–1.4)
ERYTHROCYTE [DISTWIDTH] IN BLOOD BY AUTOMATED COUNT: 44.3 FL (ref 35.9–50)
GLUCOSE SERPL-MCNC: 92 MG/DL (ref 65–99)
HCT VFR BLD AUTO: 43.8 % (ref 42–52)
HGB BLD-MCNC: 14.4 G/DL (ref 14–18)
LACTATE BLD-SCNC: 1.5 MMOL/L (ref 0.5–2)
MCH RBC QN AUTO: 30.2 PG (ref 27–33)
MCHC RBC AUTO-ENTMCNC: 32.9 G/DL (ref 33.7–35.3)
MCV RBC AUTO: 91.8 FL (ref 81.4–97.8)
PLATELET # BLD AUTO: 183 K/UL (ref 164–446)
PMV BLD AUTO: 9.6 FL (ref 9–12.9)
POTASSIUM SERPL-SCNC: 4.3 MMOL/L (ref 3.6–5.5)
RBC # BLD AUTO: 4.77 M/UL (ref 4.7–6.1)
SODIUM SERPL-SCNC: 134 MMOL/L (ref 135–145)
WBC # BLD AUTO: 9.9 K/UL (ref 4.8–10.8)

## 2021-01-19 PROCEDURE — 700102 HCHG RX REV CODE 250 W/ 637 OVERRIDE(OP): Performed by: STUDENT IN AN ORGANIZED HEALTH CARE EDUCATION/TRAINING PROGRAM

## 2021-01-19 PROCEDURE — 83605 ASSAY OF LACTIC ACID: CPT

## 2021-01-19 PROCEDURE — 99225 PR SUBSEQUENT OBSERVATION CARE,LEVEL II: CPT | Performed by: INTERNAL MEDICINE

## 2021-01-19 PROCEDURE — 700111 HCHG RX REV CODE 636 W/ 250 OVERRIDE (IP): Performed by: STUDENT IN AN ORGANIZED HEALTH CARE EDUCATION/TRAINING PROGRAM

## 2021-01-19 PROCEDURE — 97161 PT EVAL LOW COMPLEX 20 MIN: CPT

## 2021-01-19 PROCEDURE — 85027 COMPLETE CBC AUTOMATED: CPT

## 2021-01-19 PROCEDURE — 70551 MRI BRAIN STEM W/O DYE: CPT

## 2021-01-19 PROCEDURE — 96372 THER/PROPH/DIAG INJ SC/IM: CPT

## 2021-01-19 PROCEDURE — 97165 OT EVAL LOW COMPLEX 30 MIN: CPT

## 2021-01-19 PROCEDURE — G0378 HOSPITAL OBSERVATION PER HR: HCPCS

## 2021-01-19 PROCEDURE — 80048 BASIC METABOLIC PNL TOTAL CA: CPT

## 2021-01-19 PROCEDURE — A9270 NON-COVERED ITEM OR SERVICE: HCPCS | Performed by: STUDENT IN AN ORGANIZED HEALTH CARE EDUCATION/TRAINING PROGRAM

## 2021-01-19 RX ADMIN — NICOTINE TRANSDERMAL SYSTEM 21 MG: 21 PATCH, EXTENDED RELEASE TRANSDERMAL at 04:12

## 2021-01-19 RX ADMIN — ENOXAPARIN SODIUM 40 MG: 40 INJECTION SUBCUTANEOUS at 04:12

## 2021-01-19 RX ADMIN — OMEPRAZOLE 20 MG: 20 CAPSULE, DELAYED RELEASE ORAL at 04:11

## 2021-01-19 RX ADMIN — DOCUSATE SODIUM 50 MG AND SENNOSIDES 8.6 MG 2 TABLET: 8.6; 5 TABLET, FILM COATED ORAL at 04:11

## 2021-01-19 ASSESSMENT — COGNITIVE AND FUNCTIONAL STATUS - GENERAL
MOBILITY SCORE: 21
DRESSING REGULAR LOWER BODY CLOTHING: A LITTLE
STANDING UP FROM CHAIR USING ARMS: A LITTLE
SUGGESTED CMS G CODE MODIFIER MOBILITY: CJ
WALKING IN HOSPITAL ROOM: A LITTLE
SUGGESTED CMS G CODE MODIFIER DAILY ACTIVITY: CJ
CLIMB 3 TO 5 STEPS WITH RAILING: A LITTLE
DAILY ACTIVITIY SCORE: 21
TOILETING: A LITTLE
HELP NEEDED FOR BATHING: A LITTLE

## 2021-01-19 ASSESSMENT — GAIT ASSESSMENTS
GAIT LEVEL OF ASSIST: SUPERVISED
DISTANCE (FEET): 200
DEVIATION: SHUFFLED GAIT;BRADYKINETIC;DECREASED HEEL STRIKE;DECREASED TOE OFF

## 2021-01-19 ASSESSMENT — PAIN DESCRIPTION - PAIN TYPE: TYPE: ACUTE PAIN

## 2021-01-19 ASSESSMENT — ACTIVITIES OF DAILY LIVING (ADL): TOILETING: INDEPENDENT

## 2021-01-19 NOTE — THERAPY
Physical Therapy   Initial Evaluation     Patient Name: Sudeep Boyd  Age:  75 y.o., Sex:  male  Medical Record #: [id number]  Today's Date: 1/19/2021     Precautions: Fall Risk    Assessment  Patient is 75 y.o. male presenting acutely s/p GLF due to unknown cause however pts dtr noted that pt was having tremor/seizure-like activity. MRI remains pending. Pt demonstrating good strength in BLE for fxnl mob. He performs transfers and amb without AD and SPV. Initially gait very shuffled with decreased step height and length bilaterally however this improved with duration of amb and pt reports it was due to not having shoes on. Although pt able to amb without AD, discussed recommendation of FWW when he is feeling unsteady and he is agreeable to this. No further acute PT needs at this time.    Plan    Recommend Physical Therapy for Evaluation only. Patient will not be actively followed for physical therapy services at this time, however may be seen if requested by physician for 1 more visit within 30 days to address any discharge or equipment needs.    DC Equipment Recommendations: None (CM note indicates dtr reporting FWW despite pt report)  Discharge Recommendations: Anticipate that the patient will have no further physical therapy needs after discharge from the hospital     Objective     01/19/21 1320   Prior Living Situation   Prior Services None   Housing / Facility 1 Story House (with basement)   Steps In Home (FOS down to basement, pt lives on entry level)   Equipment Owned Grab Bar(s) In Tub / Shower  (inconsisttent report of DME items, dtr reported pt has FWW)   Lives with - Patient's Self Care Capacity Adult Children   Comments Lives w/ dtr. Inconsistent reporting of DME - may be poor historian   Prior Level of Functional Mobility   Bed Mobility Independent   Transfer Status Independent   Ambulation Independent   Distance Ambulation (Feet) (Limited community distances)   Assistive Devices Used None    Cognition    Speech/ Communication Delayed Responses   Level of Consciousness Alert   New Learning Impaired   Sequencing Impaired   Comments very pleasant and cooperative, hx of dementia per chart review.    Strength Lower Body   Lower Body Strength  WDL   Balance Assessment   Sitting Balance (Static) Good   Sitting Balance (Dynamic) Good   Standing Balance (Static) Fair +   Standing Balance (Dynamic) Fair +   Gait Analysis   Gait Level Of Assist Supervised   Assistive Device None   Distance (Feet) 200   Deviation Shuffled Gait;Bradykinetic;Decreased Heel Strike;Decreased Toe Off   # of Stairs Climbed 0   Weight Bearing Status No restrictions   Comments Initially with shortened step length and height which he reported was due to not having shoes. However, improved gait mechanics with duration of amb.   Bed Mobility    Comments Bed mot assessed as pt up in chair pre/post session. No deficits reported from OT assessment for bed mob.   Functional Mobility   Bed, Chair, Wheelchair Transfer Supervised

## 2021-01-19 NOTE — THERAPY
"Occupational Therapy   Initial Evaluation     Patient Name: Sudeep Boyd  Age:  75 y.o., Sex:  male  Medical Record #: [id number]  Today's Date: 1/19/2021     Precautions  Precautions: Fall Risk  Comments: seizure precautions    Assessment  Patient is 75 y.o. male with a diagnosis of GLF and seizure like activity, workup ongoing. Hx of dementia.  Appears to be near baseline, pt denies any further deficits in self care at this time.     Plan    Patient will not be actively followed for occupational therapy services at this time, however may be seen if requested by physician for 1 more visit within 30 days to address any discharge or equipment needs.       DC Equipment Recommendations: None  Discharge Recommendations: Anticipate that the patient will have no further occupational therapy needs after discharge from the hospital. Recommend he get assistance w/  Managing any new medications upon discharge and  Assistance with IADLs as needed.     Subjective    \"I apologize if I'm acting smart, I'm just frustrated about being here\"     Objective       01/19/21 1119   Prior Living Situation   Prior Services Home-Independent   Housing / Facility 1 Story House  (w/ basement)   Bathroom Set up Bathtub / Shower Combination;Grab Bars   Equipment Owned Grab Bar(s) In Tub / Shower;Front-Wheel Walker;Single Point Cane  (does not use FWW/SPC normally)   Lives with - Patient's Self Care Capacity Adult Children   Comments Pt lives with his daughter Niya. He reports she is in and out during the day. She helps intermittently w/ IADLs however he likes to do his ADL/IADL on his own. She drives. Pt reports his car did not pass smog test.    Prior Level of ADL Function   Self Feeding Independent   Grooming / Hygiene Independent   Bathing Independent   Dressing Independent   Toileting Independent   Prior Level of IADL Function   Medication Management Independent   Laundry Independent   Kitchen Mobility Independent   Finances " Independent   Home Management Independent   Shopping Independent   Prior Level Of Mobility Independent Without Device in Community;Independent Without Device in Home   Occupation (Pre-Hospital Vocational) Retired Due To Age   Cognition    Cognition / Consciousness X   Speech/ Communication Delayed Responses   Level of Consciousness Alert   New Learning Impaired   Comments pleasant and cooperative, hx of dementia consistent with cognitive function today - delayed at times, impaired STM, covers up memory deficit with humor   Bed Mobility    Supine to Sit Supervised   Sit to Supine Supervised   Scooting Supervised   ADL Assessment   Grooming Supervision;Standing  (oral care, wash face, wash hands)   Lower Body Dressing Supervision  (don socks, don underwear)   Functional Mobility   Sit to Stand Supervised   Bed, Chair, Wheelchair Transfer Supervised   Tub / Shower Transfers Supervised  (w/ grab bar, simulated like at home)   Mobility EOB>BR>Tub txf>BTB   Comments no AD

## 2021-01-19 NOTE — PROGRESS NOTES
With patient’s permission (if able), completed daily phone call to designated support person, russell Núñez  Discussed patient condition and plan of care. All questions answered.

## 2021-01-19 NOTE — PROGRESS NOTES
[facility] Medicine Daily Progress Note    Date of Service  1/19/2021    Chief Complaint  75 y.o. male admitted 1/17/2021 with fall    Hospital Course  75-year-old male with history of dementia, and neuropathy presented 1/17 with daughter for fall and possible stroke versus seizure, according to the daughter patient's last known normal time was the night before admission, she found her father on the floor with blood covering his face and tremors/seizure-like activity, no tongue biting and no fecal or urinary incontinence however he was confused, on admission no focal neurology deficit and he was alert and oriented x3, CT head without contrast showed mild cerebral atrophy with no bleeding or acute finding, CT spine did not show any fracture and CT perfusion did not show any infarction, CTA head and neck no significant stenosis with 75 stenosis of the origin of the right vertebral artery, neurologist was consulted and recommended EEG and MRI.      Interval Problem Update  -Evaluated examined the patient at bedside, the patient is alert and oriented however some confusion likely his baseline  -EEG was normal  -Waiting for MRI  -PT and OT cleared the patient for discharge  -Possible discharge tomorrow morning after MRI result.    Consultants/Specialty  Neurology    Code Status  Full Code    Disposition  Home tomorrow morning after MRI reading.    Review of Systems  Review of Systems   All other systems reviewed and are negative.       Physical Exam  Temp:  [36.4 °C (97.6 °F)-37.1 °C (98.7 °F)] 36.4 °C (97.6 °F)  Pulse:  [68-86] 69  Resp:  [16] 16  BP: (132-147)/(62-80) 139/62  SpO2:  [94 %-96 %] 94 %    Physical Exam  Constitutional:       Appearance: He is not ill-appearing.   HENT:      Head: Normocephalic and atraumatic.   Eyes:      General: No scleral icterus.  Cardiovascular:      Rate and Rhythm: Normal rate.      Heart sounds: No murmur.   Pulmonary:      Effort: No respiratory distress.      Breath sounds: No  wheezing or rales.   Abdominal:      General: Abdomen is flat. Bowel sounds are normal. There is no distension.      Palpations: Abdomen is soft.      Tenderness: There is no abdominal tenderness. There is no guarding.   Musculoskeletal:         General: No swelling or deformity.      Right lower leg: No edema.      Left lower leg: No edema.   Skin:     Coloration: Skin is not jaundiced.      Findings: No bruising or lesion.   Neurological:      Mental Status: He is alert.      Cranial Nerves: No cranial nerve deficit.      Sensory: No sensory deficit.      Motor: No weakness.      Comments: No focal deficit, patient alert and oriented however he is confused         Fluids    Intake/Output Summary (Last 24 hours) at 1/19/2021 1617  Last data filed at 1/19/2021 0400  Gross per 24 hour   Intake --   Output 700 ml   Net -700 ml       Laboratory  Recent Labs     01/17/21 2319 01/19/21  0633   WBC 12.3* 9.9   RBC 4.90 4.77   HEMOGLOBIN 14.8 14.4   HEMATOCRIT 45.1 43.8   MCV 92.0 91.8   MCH 30.2 30.2   MCHC 32.8* 32.9*   RDW 44.7 44.3   PLATELETCT 219 183   MPV 9.3 9.6     Recent Labs     01/17/21  2319 01/19/21  0633   SODIUM 135 134*   POTASSIUM 3.5* 4.3   CHLORIDE 101 101   CO2 16* 21   GLUCOSE 165* 92   BUN 16 14   CREATININE 1.12 1.04   CALCIUM 9.2 9.1                   Imaging  [id number] VIEW   Final Result      No findings to preclude MRI evaluation.      DX-CHEST-PORTABLE (1 VIEW)   Final Result      1.  Mild cardiac silhouette enlargement.   2.  Mild nonspecific interstitial opacities in the left lung which could be related to atelectasis, chronic changes, asymmetric mild edema or infection.   3.  No findings to preclude MRI evaluation.      CT-CTA HEAD WITH & W/O-POST PROCESS   Final Result         1.  No large vessel occlusion or aneurysm appreciated.      CT-CTA NECK WITH & W/O-POST PROCESSING   Final Result         1.  50% stenosis of the proximal right internal carotid artery at the bifurcation.   2.   Less than 50% stenosis of the left carotid bifurcation.   3.  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  Atherosclerosis of the origin of the left vertebral artery, appears resulting in component of stenosis however transverse orientation of the left vertebral artery at its origin limits characterization.         CT-CEREBRAL PERFUSION ANALYSIS   Final Result         1.  Cerebral blood flow less than 30% likely representing completed infarct = 0 mL.      2.  T Max more than 6 seconds likely representing combination of completed infarct and ischemia = 0 mL.      3.  Mismatched volume likely representing ischemic brain/penumbra = None      4.  Please note that the cerebral perfusion was performed on the limited brain tissue around the basal ganglia region. Infarct/ischemia outside the CT perfusion sections can be missed in this study.      CT-HEAD W/O   Final Result         1.  No acute intracranial abnormality is identified, there are nonspecific white matter changes, commonly associated with small vessel ischemic disease.  Associated mild cerebral atrophy is noted.   2.  Atherosclerosis.      CT-CSPINE WITHOUT PLUS RECONS   Final Result         1.  No acute traumatic bony injury of the cervical spine is apparent.   2.  Atherosclerosis      MR-BRAIN-W/O    (Results Pending)        Assessment/Plan  * Seizure-like activity (HCC)- (present on admission)  Assessment & Plan  Possible seizure/stroke  CTA head and neck no significant stenosis  Neurology consulted from ED and recommends EEG and MRI both ordered and pending  EEG did not show any seizure activities  MRI is pending  PT and OT; no need  No need for medication at this time    Metabolic acidosis, increased anion gap (IAG)- (present on admission)  Assessment & Plan  Likely related to lactic acidosis from seizure-like activity/fall  Improved  Alcohol level less than 10  Labs tomorrow    Leukocytosis- (present on admission)  Assessment &  Plan  Likely reactive   Repeat labs tomorrow    Tobacco use- (present on admission)  Assessment & Plan  I have counseled the patient on smoking cessation for 5 minutes and he is in agreement with nicotine patch.  He also is in agreement with outpatient smoking cessation counseling as he would like to quit smoking.    Hypokalemia- (present on admission)  Assessment & Plan  Resolved  Labs daily       VTE prophylaxis: Lovenox

## 2021-01-19 NOTE — PROGRESS NOTES
2 RN skin check complete with Marie Correa RN   Devices in place: none.  Skin tear noted to right elbow and right wrist.   Sutures on right ear.

## 2021-01-20 VITALS
OXYGEN SATURATION: 98 % | WEIGHT: 159 LBS | HEART RATE: 67 BPM | TEMPERATURE: 98.1 F | SYSTOLIC BLOOD PRESSURE: 129 MMHG | BODY MASS INDEX: 22.26 KG/M2 | DIASTOLIC BLOOD PRESSURE: 62 MMHG | HEIGHT: 71 IN | RESPIRATION RATE: 16 BRPM

## 2021-01-20 PROBLEM — E87.29 METABOLIC ACIDOSIS, INCREASED ANION GAP (IAG): Status: RESOLVED | Noted: 2021-01-18 | Resolved: 2021-01-20

## 2021-01-20 PROBLEM — D72.829 LEUKOCYTOSIS: Status: RESOLVED | Noted: 2021-01-18 | Resolved: 2021-01-20

## 2021-01-20 PROBLEM — R56.9 SEIZURE-LIKE ACTIVITY (HCC): Status: RESOLVED | Noted: 2021-01-18 | Resolved: 2021-01-20

## 2021-01-20 LAB
ANION GAP SERPL CALC-SCNC: 10 MMOL/L (ref 7–16)
BASOPHILS # BLD AUTO: 0.7 % (ref 0–1.8)
BASOPHILS # BLD: 0.05 K/UL (ref 0–0.12)
BUN SERPL-MCNC: 15 MG/DL (ref 8–22)
CALCIUM SERPL-MCNC: 9.1 MG/DL (ref 8.5–10.5)
CHLORIDE SERPL-SCNC: 102 MMOL/L (ref 96–112)
CO2 SERPL-SCNC: 25 MMOL/L (ref 20–33)
CREAT SERPL-MCNC: 1.05 MG/DL (ref 0.5–1.4)
EOSINOPHIL # BLD AUTO: 0.22 K/UL (ref 0–0.51)
EOSINOPHIL NFR BLD: 3.1 % (ref 0–6.9)
ERYTHROCYTE [DISTWIDTH] IN BLOOD BY AUTOMATED COUNT: 42.6 FL (ref 35.9–50)
FOLATE SERPL-MCNC: 9.9 NG/ML
GLUCOSE SERPL-MCNC: 99 MG/DL (ref 65–99)
HCT VFR BLD AUTO: 43.3 % (ref 42–52)
HGB BLD-MCNC: 14.4 G/DL (ref 14–18)
IMM GRANULOCYTES # BLD AUTO: 0.03 K/UL (ref 0–0.11)
IMM GRANULOCYTES NFR BLD AUTO: 0.4 % (ref 0–0.9)
LYMPHOCYTES # BLD AUTO: 1.66 K/UL (ref 1–4.8)
LYMPHOCYTES NFR BLD: 23.1 % (ref 22–41)
MAGNESIUM SERPL-MCNC: 2.1 MG/DL (ref 1.5–2.5)
MCH RBC QN AUTO: 30 PG (ref 27–33)
MCHC RBC AUTO-ENTMCNC: 33.3 G/DL (ref 33.7–35.3)
MCV RBC AUTO: 90.2 FL (ref 81.4–97.8)
MONOCYTES # BLD AUTO: 0.67 K/UL (ref 0–0.85)
MONOCYTES NFR BLD AUTO: 9.3 % (ref 0–13.4)
NEUTROPHILS # BLD AUTO: 4.55 K/UL (ref 1.82–7.42)
NEUTROPHILS NFR BLD: 63.4 % (ref 44–72)
NRBC # BLD AUTO: 0 K/UL
NRBC BLD-RTO: 0 /100 WBC
PLATELET # BLD AUTO: 190 K/UL (ref 164–446)
PMV BLD AUTO: 9.6 FL (ref 9–12.9)
POTASSIUM SERPL-SCNC: 3.8 MMOL/L (ref 3.6–5.5)
PROCALCITONIN SERPL-MCNC: <0.05 NG/ML
PROLACTIN SERPL-MCNC: 12.1 NG/ML (ref 2.1–17.7)
RBC # BLD AUTO: 4.8 M/UL (ref 4.7–6.1)
SODIUM SERPL-SCNC: 137 MMOL/L (ref 135–145)
TREPONEMA PALLIDUM IGG+IGM AB [PRESENCE] IN SERUM OR PLASMA BY IMMUNOASSAY: NORMAL
TSH SERPL DL<=0.005 MIU/L-ACNC: 2.46 UIU/ML (ref 0.38–5.33)
VIT B12 SERPL-MCNC: 232 PG/ML (ref 211–911)
WBC # BLD AUTO: 7.2 K/UL (ref 4.8–10.8)

## 2021-01-20 PROCEDURE — 84443 ASSAY THYROID STIM HORMONE: CPT

## 2021-01-20 PROCEDURE — 82607 VITAMIN B-12: CPT

## 2021-01-20 PROCEDURE — 86780 TREPONEMA PALLIDUM: CPT

## 2021-01-20 PROCEDURE — G0378 HOSPITAL OBSERVATION PER HR: HCPCS

## 2021-01-20 PROCEDURE — 85025 COMPLETE CBC W/AUTO DIFF WBC: CPT

## 2021-01-20 PROCEDURE — 700111 HCHG RX REV CODE 636 W/ 250 OVERRIDE (IP): Performed by: INTERNAL MEDICINE

## 2021-01-20 PROCEDURE — 96372 THER/PROPH/DIAG INJ SC/IM: CPT

## 2021-01-20 PROCEDURE — A9270 NON-COVERED ITEM OR SERVICE: HCPCS | Performed by: INTERNAL MEDICINE

## 2021-01-20 PROCEDURE — 84146 ASSAY OF PROLACTIN: CPT

## 2021-01-20 PROCEDURE — 80048 BASIC METABOLIC PNL TOTAL CA: CPT

## 2021-01-20 PROCEDURE — A9270 NON-COVERED ITEM OR SERVICE: HCPCS | Performed by: STUDENT IN AN ORGANIZED HEALTH CARE EDUCATION/TRAINING PROGRAM

## 2021-01-20 PROCEDURE — 82746 ASSAY OF FOLIC ACID SERUM: CPT

## 2021-01-20 PROCEDURE — 99217 PR OBSERVATION CARE DISCHARGE: CPT | Performed by: INTERNAL MEDICINE

## 2021-01-20 PROCEDURE — 84145 PROCALCITONIN (PCT): CPT

## 2021-01-20 PROCEDURE — 83735 ASSAY OF MAGNESIUM: CPT

## 2021-01-20 PROCEDURE — 700102 HCHG RX REV CODE 250 W/ 637 OVERRIDE(OP): Performed by: INTERNAL MEDICINE

## 2021-01-20 PROCEDURE — 700111 HCHG RX REV CODE 636 W/ 250 OVERRIDE (IP): Performed by: STUDENT IN AN ORGANIZED HEALTH CARE EDUCATION/TRAINING PROGRAM

## 2021-01-20 PROCEDURE — 700102 HCHG RX REV CODE 250 W/ 637 OVERRIDE(OP): Performed by: STUDENT IN AN ORGANIZED HEALTH CARE EDUCATION/TRAINING PROGRAM

## 2021-01-20 RX ORDER — NICOTINE 21 MG/24HR
1 PATCH, TRANSDERMAL 24 HOURS TRANSDERMAL EVERY 24 HOURS
Qty: 7 PATCH | Refills: 0 | Status: SHIPPED | OUTPATIENT
Start: 2021-01-20 | End: 2021-01-27

## 2021-01-20 RX ORDER — ATORVASTATIN CALCIUM 40 MG/1
40 TABLET, FILM COATED ORAL EVERY EVENING
Qty: 30 TAB | Refills: 3 | Status: SHIPPED | OUTPATIENT
Start: 2021-01-20

## 2021-01-20 RX ORDER — OMEPRAZOLE 20 MG/1
20 CAPSULE, DELAYED RELEASE ORAL DAILY
Qty: 30 CAP | Refills: 1 | Status: SHIPPED | OUTPATIENT
Start: 2021-01-21

## 2021-01-20 RX ORDER — ASPIRIN 81 MG/1
81 TABLET, CHEWABLE ORAL DAILY
Status: DISCONTINUED | OUTPATIENT
Start: 2021-01-20 | End: 2021-01-20 | Stop reason: HOSPADM

## 2021-01-20 RX ORDER — ASPIRIN 81 MG/1
81 TABLET, CHEWABLE ORAL DAILY
Qty: 100 TAB | Refills: 3 | Status: SHIPPED | OUTPATIENT
Start: 2021-01-20

## 2021-01-20 RX ORDER — NICOTINE 21 MG/24HR
1 PATCH, TRANSDERMAL 24 HOURS TRANSDERMAL EVERY 24 HOURS
Qty: 7 PATCH | Refills: 0 | Status: SHIPPED | OUTPATIENT
Start: 2021-01-28 | End: 2021-02-04

## 2021-01-20 RX ORDER — ATORVASTATIN CALCIUM 40 MG/1
40 TABLET, FILM COATED ORAL EVERY EVENING
Status: DISCONTINUED | OUTPATIENT
Start: 2021-01-20 | End: 2021-01-20 | Stop reason: HOSPADM

## 2021-01-20 RX ORDER — CYANOCOBALAMIN 1000 UG/ML
1000 INJECTION, SOLUTION INTRAMUSCULAR; SUBCUTANEOUS DAILY
Status: DISCONTINUED | OUTPATIENT
Start: 2021-01-20 | End: 2021-01-20 | Stop reason: HOSPADM

## 2021-01-20 RX ORDER — FOLIC ACID 1 MG/1
1 TABLET ORAL DAILY
Status: DISCONTINUED | OUTPATIENT
Start: 2021-01-20 | End: 2021-01-20 | Stop reason: HOSPADM

## 2021-01-20 RX ORDER — FOLIC ACID 1 MG/1
1 TABLET ORAL DAILY
Qty: 60 TAB | Refills: 2 | Status: SHIPPED | OUTPATIENT
Start: 2021-01-20

## 2021-01-20 RX ADMIN — OMEPRAZOLE 20 MG: 20 CAPSULE, DELAYED RELEASE ORAL at 05:10

## 2021-01-20 RX ADMIN — ASPIRIN 81 MG: 81 TABLET, CHEWABLE ORAL at 09:36

## 2021-01-20 RX ADMIN — ENOXAPARIN SODIUM 40 MG: 40 INJECTION SUBCUTANEOUS at 05:10

## 2021-01-20 RX ADMIN — CYANOCOBALAMIN 1000 MCG: 1000 INJECTION, SOLUTION INTRAMUSCULAR; SUBCUTANEOUS at 09:36

## 2021-01-20 RX ADMIN — NICOTINE TRANSDERMAL SYSTEM 21 MG: 21 PATCH, EXTENDED RELEASE TRANSDERMAL at 05:11

## 2021-01-20 RX ADMIN — FOLIC ACID 1 MG: 1 TABLET ORAL at 09:36

## 2021-01-20 RX ADMIN — ACETAMINOPHEN 650 MG: 325 TABLET ORAL at 01:11

## 2021-01-20 RX ADMIN — DOCUSATE SODIUM 50 MG AND SENNOSIDES 8.6 MG 2 TABLET: 8.6; 5 TABLET, FILM COATED ORAL at 05:10

## 2021-01-20 NOTE — PROGRESS NOTES
D/C'd.  Discharge instructions provided to pt.  Pt states understanding.  Pt states all questions have been answered.  Copy of discharge provided to pt.  Signed copy in chart.  Pt states that all personal belongings are in possession.  Pt escorted off unit with assistance from this RN and CNA without incident.

## 2021-01-20 NOTE — PROGRESS NOTES
Per ER  for PCP,unable to arrange PCP apptointment due to pt's insurance not in contract with [facility]. Notified pt about inquiring with pt's insurance about PCP, or call [facility] for PCP. All questions answered.

## 2021-01-20 NOTE — DISCHARGE SUMMARY
Discharge Summary    CHIEF COMPLAINT ON ADMISSION  Chief Complaint   Patient presents with   • T-5000 FALL   • ALOC       Reason for Admission  fall    Admission Date  1/17/2021    CODE STATUS  Full Code    HPI & HOSPITAL COURSE    75-year-old male with history of dementia, and neuropathy presented 1/17 with daughter for fall and possible stroke versus seizure, according to the daughter patient's last known normal time was the night before admission, she found her father on the floor with blood covering his face and tremors/seizure-like activity, no tongue biting and no fecal or urinary incontinence however he was confused, on admission no focal neurology deficit and he was alert and oriented x3, CT head without contrast showed mild cerebral atrophy with no bleeding or acute finding, CT spine did not show any fracture and CT perfusion did not show any infarction, CTA head and neck no significant stenosis with 75% stenosis of the origin of the right vertebral arter, patient was evaluated by neurologist and recommended MRI and EEG, EEG was done and did not show any seizure activities, 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    The patient is a heavy smoker, who encouraged him to quit smoking, discussed the risk of smoking including not limited to COPD lung cancer and death, the patient will be discharged with nicotine patches.    The patient was evaluated by PT and OT, he is on baseline and no need for  therapy.    On the day of discharge the patient is alert and oriented x3, no focal neuro deficit, case was discussed with the patient and his daughter, answered all their question, agreed with the plan of care and follow-up closely with a new primary care doctor and neurology clinic.  Also encouraged the patient to avoid driving at this time.     Therefore, he is discharged in good and stable condition to home with close outpatient follow-up.    The patient met 2-midnight criteria for an inpatient stay at the time of discharge.    Discharge Date  01/20/21      FOLLOW UP ITEMS POST DISCHARGE  Follow-up with the neurology clinic for stroke  Follow-up with primary care doctor.    DISCHARGE DIAGNOSES  Principal Problem (Resolved):    Seizure-like activity (HCC) POA: Yes  Active Problems:    Tobacco use POA: Yes  Resolved Problems:    Metabolic acidosis, increased anion gap (IAG) POA: Yes    Hypokalemia POA: Yes    Leukocytosis POA: Yes      FOLLOW UP  Primary Care Doctor    Schedule an appointment as soon as possible for a visit      Stroke Clinic at [facility]    Schedule an appointment as soon as possible for a visit  [phone]      MEDICATIONS ON DISCHARGE     Medication List      START taking these medications      Instructions   aspirin 81 MG Chew chewable tablet  Commonly known as: ASA   Chew 1 Tab every day.  Dose: 81 mg     atorvastatin 40 MG Tabs  Commonly known as: LIPITOR   Take 1 Tab by mouth every evening.  Dose: 40 mg     cyanocobalamin 1000 MCG Tabs  Commonly known as: VITAMIN B12   Take 1 Tab by mouth every day.  Dose: 1,000 mcg     folic acid 1 MG Tabs  Commonly known as: FOLVITE   Take 1 Tab by mouth every day.  Dose: 1 mg     * nicotine 21 MG/24HR Pt24  Commonly known as: NICODERM   Place 1 Patch on the skin every 24 hours for 7 days.  Dose: 1 Patch     * nicotine 14 MG/24HR Pt24  Start taking on: January 28, 2021  Commonly known as: NICODERM   Place 1 Patch on the skin every 24 hours for 7 days.  Dose: 1  Patch     * nicotine 7 MG/24HR Pt24  Start taking on: February 5, 2021  Commonly known as: NICODERM   Place 1 Patch on the skin every 24 hours for 7 days.  Dose: 1 Patch         * This list has 3 medication(s) that are the same as other medications prescribed for you. Read the directions carefully, and ask your doctor or other care provider to review them with you.            CONTINUE taking these medications      Instructions   omeprazole 20 MG delayed-release capsule  Start taking on: January 21, 2021  Commonly known as: PRILOSEC   Take 1 Cap by mouth every day.  Dose: 20 mg     risperiDONE 1 MG Tabs  Commonly known as: RISPERDAL   Take 1 Tab by mouth 2 times a day.  Dose: 1 mg        STOP taking these medications    nitrofurantoin 100 MG Caps  Commonly known as: Macrobid            Allergies  Allergies   Allergen Reactions   • Codeine      Pt report that he is not sure what happens, it was to long ago       DIET  Orders Placed This Encounter   Procedures   • Diet Order Diet: Cardiac     Standing Status:   Standing     Number of Occurrences:   1     Order Specific Question:   Diet:     Answer:   Cardiac [6]       ACTIVITY  As tolerated.  Weight bearing as tolerated    CONSULTATIONS  Neurology    PROCEDURES  EEG    LABORATORY  Lab Results   Component Value Date    SODIUM 137 01/20/2021    POTASSIUM 3.8 01/20/2021    CHLORIDE 102 01/20/2021    CO2 25 01/20/2021    GLUCOSE 99 01/20/2021    BUN 15 01/20/2021    CREATININE 1.05 01/20/2021        Lab Results   Component Value Date    WBC 7.2 01/20/2021    HEMOGLOBIN 14.4 01/20/2021    HEMATOCRIT 43.3 01/20/2021    PLATELETCT 190 01/20/2021        Total time of the discharge process exceeds 34 minutes.

## 2021-01-20 NOTE — DISCHARGE INSTRUCTIONS
Discharge Instructions    Discharged to home by car with relative. Discharged via wheelchair, hospital escort: Yes.  Special equipment needed: Not Applicable    Be sure to schedule a follow-up appointment with your primary care doctor or any specialists as instructed.     Discharge Plan:        I understand that a diet low in cholesterol, fat, and sodium is recommended for good health. Unless I have been given specific instructions below for another diet, I accept this instruction as my diet prescription.   Other diet Cardiac diet    Special Instructions: None    · Is patient discharged on Warfarin / Coumadin?   No       Ischemic Stroke    An ischemic stroke is the sudden death of brain tissue. Blood carries oxygen to all areas of the body. This type of stroke happens when your blood does not flow to your brain like normal. Your brain cannot get the oxygen it needs. This is an emergency. It must be treated right away.  Symptoms of a stroke usually happen all of a sudden. You may notice them when you wake up. They can include:  · Weakness or loss of feeling in your face, arm, or leg. This often happens on one side of the body.  · Trouble walking.  · Trouble moving your arms or legs.  · Loss of balance or coordination.  · Feeling confused.  · Trouble talking or understanding what people are saying.  · Slurred speech.  · Trouble seeing.  · Seeing two of one object (double vision).  · Feeling dizzy.  · Feeling sick to your stomach (nauseous) and throwing up (vomiting).  · A very bad headache for no reason.  Get help as soon as any of these problems start. This is important. Some treatments work better if they are given right away. These include:  · Aspirin.  · Medicines to control blood pressure.  · A shot (injection) of medicine to break up the blood clot.  · Treatments given in the blood vessel (artery) to take out the clot or break it up.  Other treatments may include:  · Oxygen.  · Fluids given through an IV  tube.  · Medicines to thin out your blood.  · Procedures to help your blood flow better.  What increases the risk?  Certain things may make you more likely to have a stroke. Some of these are things that you can change, such as:  · Being very overweight (obesity).  · Smoking.  · Taking birth control pills.  · Not being active.  · Drinking too much alcohol.  · Using drugs.  Other risk factors include:  · High blood pressure.  · High cholesterol.  · Diabetes.  · Heart disease.  · Being , , , or .  · Being over age 60.  · Family history of stroke.  · Having had blood clots, stroke, or warning stroke (transient ischemic attack, TIA) in the past.  · Sickle cell disease.  · Being a woman with a history of high blood pressure in pregnancy (preeclampsia).  · Migraine headache.  · Sleep apnea.  · Having an irregular heartbeat (atrial fibrillation).  · Long-term (chronic) diseases that cause soreness and swelling (inflammation).  · Disorders that affect how your blood clots.  Follow these instructions at home:  Medicines  · Take over-the-counter and prescription medicines only as told by your doctor.  · If you were told to take aspirin or another medicine to thin your blood, take it exactly as told by your doctor.  ? Taking too much of the medicine can cause bleeding.  ? If you do not take enough, it may not work as well.  · Know the side effects of your medicines. If you are taking a blood thinner, make sure you:  ? Hold pressure over any cuts for longer than usual.  ? Tell your dentist and other doctors that you take this medicine.  ? Avoid activities that may cause damage or injury to your body.  Eating and drinking  · Follow instructions from your doctor about what you cannot eat or drink.  · Eat healthy foods.  · If you have trouble with swallowing, do these things to avoid choking:  ? Take small bites when eating.  ? Eat foods that are soft or pureed.  Safety  · Follow  "instructions from your health care team about physical activity.  · Use a walker or cane as told by your doctor.  · Keep your home safe so you do not fall. This may include:  ? Having experts look at your home to make sure it is safe.  ? Putting grab bars in the bedroom and bathroom.  ? Using raised toilets.  ? Putting a seat in the shower.  General instructions  · Do not use any tobacco products.  ? Examples of these are cigarettes, chewing tobacco, and e-cigarettes.  ? If you need help quitting, ask your doctor.  · Limit how much alcohol you drink. This means no more than 1 drink a day for nonpregnant women and 2 drinks a day for men. One drink equals 12 oz of beer, 5 oz of wine, or 1½ oz of hard liquor.  · If you need help to stop using drugs or alcohol, ask your doctor to refer you to a program or specialist.  · Stay active. Exercise as told by your doctor.  · Keep all follow-up visits as told by your doctor. This is important.  Get help right away if:    · You have any signs of a stroke. \"BE FAST\" is an easy way to remember the main warning signs:  ? B - Balance. Signs are dizziness, sudden trouble walking, or loss of balance.  ? E - Eyes. Signs are trouble seeing or a change in how you see.  ? F - Face. Signs are sudden weakness or loss of feeling of the face, or the face or eyelid drooping on one side.  ? A - Arms. Signs are weakness or loss of feeling in an arm. This happens suddenly and usually on one side of the body.  ? S - Speech. Signs are sudden trouble speaking, slurred speech, or trouble understanding what people say.  ? T - Time. Time to call emergency services. Write down what time symptoms started.  · You have other signs of a stroke, such as:  ? A sudden, very bad headache with no known cause.  ? Feeling sick to your stomach (nausea).  ? Throwing up (vomiting).  ? Jerky movements you cannot control (seizure).  These symptoms may be an emergency. Do not wait to see if the symptoms will go away. " Get medical help right away. Call your local emergency services (911 in the [place] Do not drive yourself to the hospital.  Summary  · An ischemic stroke is the sudden death of brain tissue.  · Symptoms of a stroke usually happen all of a sudden. You may notice them when you wake up.  · Get help if you have any warning signs of a stroke. This is important. Some treatments work better if they are given right away.  This information is not intended to replace advice given to you by your health care provider. Make sure you discuss any questions you have with your health care provider.  Document Released: 12/06/2012 Document Revised: 05/29/2019 Document Reviewed: 03/15/2017  [facility] Patient Education © 2020 [employer]      Depression / Suicide Risk    As you are discharged from this [facility] facility, it is important to learn how to keep safe from harming yourself.    Recognize the warning signs:  · Abrupt changes in personality, positive or negative- including increase in energy   · Giving away possessions  · Change in eating patterns- significant weight changes-  positive or negative  · Change in sleeping patterns- unable to sleep or sleeping all the time   · Unwillingness or inability to communicate  · Depression  · Unusual sadness, discouragement and loneliness  · Talk of wanting to die  · Neglect of personal appearance   · Rebelliousness- reckless behavior  · Withdrawal from people/activities they love  · Confusion- inability to concentrate     If you or a loved one observes any of these behaviors or has concerns about self-harm, here's what you can do:  · Talk about it- your feelings and reasons for harming yourself  · Remove any means that you might use to hurt yourself (examples: pills, rope, extension cords, firearm)  · Get professional help from the community (Mental Health, Substance Abuse, psychological counseling)  · Do not be alone:Call your Safe Contact- someone whom you trust who will be there  for you.  · Call your local CRISIS HOTLINE 532-2133 or 941-931-6666  · Call your local Children's Mobile Crisis Response Team [place] (802) 064-9959 or www.[employer]  · Call the toll free National Suicide Prevention Hotlines   · National Suicide Prevention Lifeline 706-023-BNGI (6517)  · National [employer] Line Network 800-SUICIDE (596-3109)

## 2021-01-20 NOTE — HOSPITAL COURSE
75-year-old male with history of dementia, and neuropathy presented 1/17 with daughter for fall and possible stroke versus seizure, according to the daughter patient's last known normal time was the night before admission, she found her father on the floor with blood covering his face and tremors/seizure-like activity, no tongue biting and no fecal or urinary incontinence however he was confused, on admission no focal neurology deficit and he was alert and oriented x3, CT head without contrast showed mild cerebral atrophy with no bleeding or acute finding, CT spine did not show any fracture and CT perfusion did not show any infarction, CTA head and neck no significant stenosis with 75 stenosis of the origin of the right vertebral artery, neurologist was consulted and recommended EEG and MRI.